# Patient Record
Sex: MALE
[De-identification: names, ages, dates, MRNs, and addresses within clinical notes are randomized per-mention and may not be internally consistent; named-entity substitution may affect disease eponyms.]

---

## 2021-09-21 ENCOUNTER — APPOINTMENT (OUTPATIENT)
Dept: UROLOGY | Facility: CLINIC | Age: 31
End: 2021-09-21
Payer: COMMERCIAL

## 2021-09-21 VITALS
SYSTOLIC BLOOD PRESSURE: 103 MMHG | HEIGHT: 67 IN | BODY MASS INDEX: 34.53 KG/M2 | HEART RATE: 75 BPM | DIASTOLIC BLOOD PRESSURE: 69 MMHG | WEIGHT: 220 LBS | TEMPERATURE: 98.1 F

## 2021-09-21 PROCEDURE — 51798 US URINE CAPACITY MEASURE: CPT

## 2021-09-21 PROCEDURE — 99203 OFFICE O/P NEW LOW 30 MIN: CPT | Mod: 25

## 2021-09-21 NOTE — ASSESSMENT
[FreeTextEntry1] : Incomplete bladder emptying:\par PVR: 140 ml. \par Will get Urinalysis with reflex Urine culture.\par \par Return to office in 2 weeks or sooner if any issues: will do Uroflo/PVR and possible Cystoscopy.

## 2021-09-21 NOTE — PHYSICAL EXAM
[Normal Appearance] : normal appearance [General Appearance - In No Acute Distress] : no acute distress [FreeTextEntry1] : normal peripheral circulation  [] : no respiratory distress [Abdomen Soft] : soft [Abdomen Tenderness] : non-tender [Costovertebral Angle Tenderness] : no ~M costovertebral angle tenderness [Urethral Meatus] : meatus normal [Penis Abnormality] : normal circumcised penis [Scrotum] : the scrotum was normal [Epididymis] : the epididymides were normal [Testes Tenderness] : no tenderness of the testes [Testes Mass (___cm)] : there were no testicular masses [Prostate Tenderness] : the prostate was not tender [No Prostate Nodules] : no prostate nodules [Prostate Size ___ (0-4)] : prostate size [unfilled] (scale: 0-4) [Normal Station and Gait] : the gait and station were normal for the patient's age [No Focal Deficits] : no focal deficits [Skin Color & Pigmentation] : normal skin color and pigmentation [Oriented To Time, Place, And Person] : oriented to person, place, and time

## 2021-09-21 NOTE — HISTORY OF PRESENT ILLNESS
[FreeTextEntry1] : 29 yo male presents for Incomplete bladder emptying. \par For last few months has sense of incomplete emptying. \par Reports reasonable stream, urinates 3-4 x or so during the day. Nocturia of 1 x. \par Has off and on hesitancy, straining and post void dribbling. Also complaining of right groin pain. \par Denies dysuria, hematuria, lower abdominal or flank pain, fever, chills or rigors.\par Normal erections and libido. \par No Constipation. \par \par Has history of Brain tumor. Status post Chemo-radiation. On Desmopressin and Testosterone. \par Follows with Endocrinologist. \par \par Has had Wilkerson catheter in the past. \par \par

## 2021-09-21 NOTE — LETTER BODY
[Dear  ___] : Dear  [unfilled], [Consult Letter:] : I had the pleasure of evaluating your patient, [unfilled]. [( Thank you for referring [unfilled] for consultation for _____ )] : Thank you for referring [unfilled] for consultation for [unfilled] [Please see my note below.] : Please see my note below. [Consult Closing:] : Thank you very much for allowing me to participate in the care of this patient.  If you have any questions, please do not hesitate to contact me. [Sincerely,] : Sincerely, [FreeTextEntry3] : Poncho Aden MD\par  of Urology\par [facility]\par \par Offices:\par [facility] @\par [address], [place] [address]\par and\par [address], [place] [address], [address]\par and\par [address] [address]\par \par TEL: [phone]\par FAX: [phone]

## 2021-09-22 LAB
APPEARANCE: CLEAR
BILIRUBIN URINE: NEGATIVE
BLOOD URINE: NEGATIVE
COLOR: NORMAL
GLUCOSE QUALITATIVE U: NEGATIVE
KETONES URINE: NEGATIVE
LEUKOCYTE ESTERASE URINE: NEGATIVE
NITRITE URINE: NEGATIVE
PH URINE: 7
PROTEIN URINE: NEGATIVE
SPECIFIC GRAVITY URINE: 1.01
UROBILINOGEN URINE: NORMAL

## 2021-10-26 ENCOUNTER — APPOINTMENT (OUTPATIENT)
Dept: UROLOGY | Facility: CLINIC | Age: 31
End: 2021-10-26
Payer: COMMERCIAL

## 2021-10-26 DIAGNOSIS — R33.9 RETENTION OF URINE, UNSPECIFIED: ICD-10-CM

## 2021-10-26 PROCEDURE — 51741 ELECTRO-UROFLOWMETRY FIRST: CPT

## 2021-10-26 PROCEDURE — 99213 OFFICE O/P EST LOW 20 MIN: CPT | Mod: 25

## 2021-10-26 PROCEDURE — 51798 US URINE CAPACITY MEASURE: CPT

## 2021-11-01 PROBLEM — R33.9 INCOMPLETE BLADDER EMPTYING: Status: ACTIVE | Noted: 2021-09-21

## 2021-11-01 NOTE — HISTORY OF PRESENT ILLNESS
[FreeTextEntry1] : 29 yo male presents for follow up. \par Symptoms better. No longer has sense of incomplete emptying, hesitancy, straining and post void dribbling.\par \par Initially seen for Incomplete bladder emptying. \par For last few months had sense of incomplete emptying. \par Reported reasonable stream, urinates 3-4 x or so during the day. Nocturia of 1 x. \par Had off and on hesitancy, straining and post void dribbling. Also complaining of right groin pain. \par Denied dysuria, hematuria, lower abdominal or flank pain, fever, chills or rigors.\par Normal erections and libido. \par No Constipation. \par \par Has history of Brain tumor. Status post Chemo-radiation. On Desmopressin and Testosterone. \par Follows with Endocrinologist. \par \par Has had Wilkerson catheter in the past. \par \par

## 2021-11-01 NOTE — ASSESSMENT
[FreeTextEntry1] : Incomplete bladder emptying:\par See Uroflo and PVR. \par Symptoms resolved. \par Discussed lower urinary tract symptoms were possibly secondary to Prostatitis or Constipation. \par \par Return to office as needed.

## 2021-11-01 NOTE — LETTER BODY
[Dear  ___] : Dear  [unfilled], [Courtesy Letter:] : I had the pleasure of seeing your patient, [unfilled], in my office today. [Please see my note below.] : Please see my note below. [Sincerely,] : Sincerely, [FreeTextEntry3] : Poncho Aden MD\par  of Urology\par [facility]\par \par Offices:\par [facility] @\par [address], [place] [address]\par and\par [address], [place] [address], [address]\par and\par [address] [address]\par \par TEL: [phone]\par FAX: [phone]

## 2024-02-22 ENCOUNTER — OFFICE (OUTPATIENT)
Dept: URBAN - METROPOLITAN AREA CLINIC 112 | Facility: CLINIC | Age: 34
Setting detail: OPHTHALMOLOGY
End: 2024-02-22
Payer: COMMERCIAL

## 2024-02-22 DIAGNOSIS — H10.212: ICD-10-CM

## 2024-02-22 PROCEDURE — 99203 OFFICE O/P NEW LOW 30 MIN: CPT | Performed by: OPHTHALMOLOGY

## 2024-02-22 ASSESSMENT — CONFRONTATIONAL VISUAL FIELD TEST (CVF)
OD_FINDINGS: FULL
OS_FINDINGS: FULL

## 2024-02-22 ASSESSMENT — REFRACTION_AUTOREFRACTION
OS_SPHERE: +1.25
OD_AXIS: 100
OS_CYLINDER: -2.00
OD_CYLINDER: -1.50
OS_AXIS: 082
OD_SPHERE: +0.75

## 2024-02-22 ASSESSMENT — SPHEQUIV_DERIVED
OD_SPHEQUIV: 0
OS_SPHEQUIV: 0.25

## 2024-02-22 ASSESSMENT — CORNEAL TRAUMA - ABRASION: OS_ABRASION: ABSENT

## 2024-02-22 ASSESSMENT — CORNEAL TRAUMA - FOREIGN BODY: OS_FOREIGNBODY: ABSENT

## 2024-02-26 ENCOUNTER — RX ONLY (RX ONLY)
Age: 34
End: 2024-02-26

## 2024-02-26 ENCOUNTER — OFFICE (OUTPATIENT)
Dept: URBAN - METROPOLITAN AREA CLINIC 94 | Facility: CLINIC | Age: 34
Setting detail: OPHTHALMOLOGY
End: 2024-02-26
Payer: COMMERCIAL

## 2024-02-26 DIAGNOSIS — H16.223: ICD-10-CM

## 2024-02-26 PROBLEM — H10.212 CONJUNCTIVITIS-TOXIC, ACUTE; LEFT EYE: Status: RESOLVED | Noted: 2024-02-22 | Resolved: 2024-02-26

## 2024-02-26 PROCEDURE — 92012 INTRM OPH EXAM EST PATIENT: CPT | Performed by: PHYSICIAN ASSISTANT

## 2024-02-26 ASSESSMENT — CONFRONTATIONAL VISUAL FIELD TEST (CVF)
OS_FINDINGS: FULL
OD_FINDINGS: FULL

## 2024-02-26 ASSESSMENT — SPHEQUIV_DERIVED
OS_SPHEQUIV: -0.375
OD_SPHEQUIV: -0.375

## 2024-02-26 ASSESSMENT — REFRACTION_AUTOREFRACTION
OS_AXIS: 083
OD_AXIS: 103
OS_SPHERE: +0.25
OD_CYLINDER: -1.25
OD_SPHERE: +0.25
OS_CYLINDER: -1.25

## 2024-02-26 ASSESSMENT — SUPERFICIAL PUNCTATE KERATITIS (SPK)
OS_SPK: T 1+
OD_SPK: T 1+

## 2024-07-05 ASSESSMENT — KERATOMETRY
OD_K1POWER_DIOPTERS: 44.25
OS_K1POWER_DIOPTERS: 44.50
OD_AXISANGLE_DEGREES: 004
OS_AXISANGLE_DEGREES: 175
OS_K2POWER_DIOPTERS: 45.25
OD_K2POWER_DIOPTERS: 45.00

## 2025-04-03 ASSESSMENT — KERATOMETRY
OD_K1POWER_DIOPTERS: 44.00
OS_K2POWER_DIOPTERS: 45.00
OD_K2POWER_DIOPTERS: 45.00
OD_AXISANGLE_DEGREES: 007
OS_AXISANGLE_DEGREES: 034
OS_K1POWER_DIOPTERS: 43.75